# Patient Record
Sex: MALE | URBAN - NONMETROPOLITAN AREA
[De-identification: names, ages, dates, MRNs, and addresses within clinical notes are randomized per-mention and may not be internally consistent; named-entity substitution may affect disease eponyms.]

---

## 2023-12-27 ENCOUNTER — NURSE ONLY (OUTPATIENT)
Dept: LAB | Age: 78
End: 2023-12-27

## 2024-10-17 RX ORDER — TAMSULOSIN HYDROCHLORIDE 0.4 MG/1
0.4 CAPSULE ORAL DAILY
Status: ON HOLD | COMMUNITY
End: 2024-10-25 | Stop reason: HOSPADM

## 2024-10-17 RX ORDER — ASPIRIN 81 MG/1
1 TABLET, CHEWABLE ORAL
COMMUNITY

## 2024-10-17 RX ORDER — GLIMEPIRIDE 2 MG/1
2 TABLET ORAL 2 TIMES DAILY
COMMUNITY

## 2024-10-17 RX ORDER — POTASSIUM CHLORIDE 750 MG/1
20 CAPSULE, EXTENDED RELEASE ORAL DAILY
COMMUNITY

## 2024-10-17 RX ORDER — ATORVASTATIN CALCIUM 40 MG/1
1 TABLET, FILM COATED ORAL
COMMUNITY

## 2024-10-17 RX ORDER — FUROSEMIDE 40 MG/1
1 TABLET ORAL
COMMUNITY

## 2024-10-17 RX ORDER — IBUPROFEN 600 MG/1
600 TABLET, FILM COATED ORAL EVERY 8 HOURS PRN
COMMUNITY

## 2024-10-17 RX ORDER — LOSARTAN POTASSIUM 100 MG/1
1 TABLET ORAL
COMMUNITY

## 2024-10-17 RX ORDER — ATENOLOL 25 MG/1
1 TABLET ORAL
COMMUNITY

## 2024-10-17 RX ORDER — CITALOPRAM HYDROBROMIDE 20 MG/1
20 TABLET ORAL DAILY
COMMUNITY

## 2024-10-17 RX ORDER — ASPIRIN 81 MG/1
81 TABLET ORAL DAILY
COMMUNITY
End: 2024-10-17

## 2024-10-17 NOTE — PROGRESS NOTES
NPO after midnight  Bring insurance info and drivers license  Wear comfortable clean clothing  Do not bring jewelry  Shower night before and morning of surgery with a liquid antibacterial soap  Bring list of medications with dosage and how often taken  Follow all instructions given by your physician   needed at discharge  You must have a responsible adult with you day of surgery and for 24 hours after surgery  Call [facility] [phone] for any questionsSurgical Site Infections”      How can we work together to prevent Surgical Site Infections?   We would like to thank you for choosing [facility] for your Surgical Care.  Below you will find helpful information on how we can work together to prevent Surgical Site Infections.    What is a Surgical Site Infection (SSI)?  A surgical site infection is an infection that occurs after surgery in the part of the body where the surgery took place. Most patients who have surgery do not develop an infection. However, infections develop in about 1 to 3 out of every 100 patients who have surgery.  Some of the common symptoms of a surgical site infection are:  Redness and pain around the area where you had surgery  Drainage of cloudy fluid from your surgical wound  Fever    Can SSIs be treated?  Yes. Most surgical site infections can be treated with antibiotics. The antibiotic given to you depends on the bacteria (germs) causing the infection. Sometimes patients with SSIs also need another surgery to treat the infection.    What are some of the things that hospitals are doing to prevent SSIs?  To prevent SSIs, doctors, nurses, and other healthcare providers:  May remove some of your hair immediately before your surgery using electric clippers if the hair is in the same area where the procedure will occur. They should not shave you with a razor.  Give you antibiotics before your surgery starts. In most cases, you should get antibiotics within 60 minutes before the  wound before you leave the hospital.  Always clean your hands before and after caring for your wound.  Before you go home, make sure you know who to contact if you have questions or problems after you get home.  If you have any symptoms of an infection, such as redness and pain at the surgery site, drainage, or fever, call your doctor immediately.    If you have additional questions, please ask your doctor or nurse.

## 2024-10-17 NOTE — PROGRESS NOTES
Message left with Radha at Dr Escalante' office - looking for H/ P, EKG, Labs, CXR,bowel prep info sheet.

## 2024-10-17 NOTE — PROGRESS NOTES
Radha returned call - stated she talked with pt and he has not done any pre op work. Stated he would get it done tomorrow 10/18/24 at [facility]

## 2024-10-17 NOTE — PROGRESS NOTES
Colleen Garcia from DR Garcia's office called and stated that DR Garcia wants no bowel prep for this surgery

## 2024-10-17 NOTE — PROGRESS NOTES
NEGRO  spoke with pt - unable to talk  now - at the Dr office - will call us back when he is finished at the office

## 2024-10-17 NOTE — PROGRESS NOTES
Message left with Radha at Dr Garcia's  office for need of cardiac clearance for surgery and to inform pt when to stop ASA and Eliquis for surgery .

## 2024-10-24 ENCOUNTER — HOSPITAL ENCOUNTER (OUTPATIENT)
Age: 79
Discharge: HOME | End: 2024-10-25
Attending: UROLOGY | Admitting: UROLOGY
Payer: MEDICARE

## 2024-10-24 ENCOUNTER — ANESTHESIA (OUTPATIENT)
Dept: OPERATING ROOM | Age: 79
End: 2024-10-24
Payer: MEDICARE

## 2024-10-24 ENCOUNTER — ANESTHESIA EVENT (OUTPATIENT)
Dept: OPERATING ROOM | Age: 79
End: 2024-10-24
Payer: MEDICARE

## 2024-10-24 DIAGNOSIS — C61 MALIGNANT NEOPLASM OF PROSTATE (HCC): Primary | ICD-10-CM

## 2024-10-24 LAB
GLUCOSE BLD STRIP.AUTO-MCNC: 165 MG/DL (ref 70–108)
GLUCOSE BLD STRIP.AUTO-MCNC: 183 MG/DL (ref 70–108)

## 2024-10-24 PROCEDURE — 6370000000 HC RX 637 (ALT 250 FOR IP): Performed by: UROLOGY

## 2024-10-24 PROCEDURE — 3600000019 HC SURGERY ROBOT ADDTL 15MIN: Performed by: UROLOGY

## 2024-10-24 PROCEDURE — 3700000000 HC ANESTHESIA ATTENDED CARE: Performed by: UROLOGY

## 2024-10-24 PROCEDURE — C1889 IMPLANT/INSERT DEVICE, NOC: HCPCS | Performed by: UROLOGY

## 2024-10-24 PROCEDURE — 2500000003 HC RX 250 WO HCPCS: Performed by: NURSE ANESTHETIST, CERTIFIED REGISTERED

## 2024-10-24 PROCEDURE — 88309 TISSUE EXAM BY PATHOLOGIST: CPT

## 2024-10-24 PROCEDURE — 2720000010 HC SURG SUPPLY STERILE: Performed by: UROLOGY

## 2024-10-24 PROCEDURE — 2580000003 HC RX 258: Performed by: UROLOGY

## 2024-10-24 PROCEDURE — S2900 ROBOTIC SURGICAL SYSTEM: HCPCS | Performed by: UROLOGY

## 2024-10-24 PROCEDURE — 6360000002 HC RX W HCPCS: Performed by: UROLOGY

## 2024-10-24 PROCEDURE — 88305 TISSUE EXAM BY PATHOLOGIST: CPT

## 2024-10-24 PROCEDURE — 7100000001 HC PACU RECOVERY - ADDTL 15 MIN: Performed by: UROLOGY

## 2024-10-24 PROCEDURE — 3600000009 HC SURGERY ROBOT BASE: Performed by: UROLOGY

## 2024-10-24 PROCEDURE — 3700000001 HC ADD 15 MINUTES (ANESTHESIA): Performed by: UROLOGY

## 2024-10-24 PROCEDURE — 2500000003 HC RX 250 WO HCPCS: Performed by: UROLOGY

## 2024-10-24 PROCEDURE — 82948 REAGENT STRIP/BLOOD GLUCOSE: CPT

## 2024-10-24 PROCEDURE — 7100000000 HC PACU RECOVERY - FIRST 15 MIN: Performed by: UROLOGY

## 2024-10-24 PROCEDURE — 2709999900 HC NON-CHARGEABLE SUPPLY: Performed by: UROLOGY

## 2024-10-24 PROCEDURE — 6360000002 HC RX W HCPCS: Performed by: NURSE ANESTHETIST, CERTIFIED REGISTERED

## 2024-10-24 DEVICE — ALLOGRAFT HUM TISS 6X3 CM CRYOPRESERVED AMNIOX CLARIX CRD 1K: Type: IMPLANTABLE DEVICE | Site: PROSTATE | Status: FUNCTIONAL

## 2024-10-24 DEVICE — HEMOLOK L 6 CLIPS/CART
Type: IMPLANTABLE DEVICE | Site: PROSTATE | Status: FUNCTIONAL
Brand: WECK

## 2024-10-24 RX ORDER — MORPHINE SULFATE 2 MG/ML
2 INJECTION, SOLUTION INTRAMUSCULAR; INTRAVENOUS
Status: DISCONTINUED | OUTPATIENT
Start: 2024-10-24 | End: 2024-10-25 | Stop reason: HOSPADM

## 2024-10-24 RX ORDER — PHENAZOPYRIDINE HYDROCHLORIDE 100 MG/1
100 TABLET, FILM COATED ORAL EVERY 6 HOURS PRN
Status: DISCONTINUED | OUTPATIENT
Start: 2024-10-24 | End: 2024-10-25 | Stop reason: HOSPADM

## 2024-10-24 RX ORDER — SODIUM CHLORIDE 0.9 % (FLUSH) 0.9 %
5-40 SYRINGE (ML) INJECTION EVERY 12 HOURS SCHEDULED
Status: DISCONTINUED | OUTPATIENT
Start: 2024-10-24 | End: 2024-10-25 | Stop reason: HOSPADM

## 2024-10-24 RX ORDER — ACETAMINOPHEN 325 MG/1
650 TABLET ORAL EVERY 6 HOURS
Status: DISCONTINUED | OUTPATIENT
Start: 2024-10-24 | End: 2024-10-25 | Stop reason: HOSPADM

## 2024-10-24 RX ORDER — POTASSIUM CHLORIDE 750 MG/1
20 TABLET, EXTENDED RELEASE ORAL DAILY
Status: DISCONTINUED | OUTPATIENT
Start: 2024-10-24 | End: 2024-10-25 | Stop reason: HOSPADM

## 2024-10-24 RX ORDER — OXYCODONE HYDROCHLORIDE 5 MG/1
10 TABLET ORAL EVERY 4 HOURS PRN
Status: DISCONTINUED | OUTPATIENT
Start: 2024-10-24 | End: 2024-10-25 | Stop reason: HOSPADM

## 2024-10-24 RX ORDER — MORPHINE SULFATE 4 MG/ML
4 INJECTION, SOLUTION INTRAMUSCULAR; INTRAVENOUS
Status: DISCONTINUED | OUTPATIENT
Start: 2024-10-24 | End: 2024-10-25 | Stop reason: HOSPADM

## 2024-10-24 RX ORDER — MULTIVITAMIN WITH IRON
1 TABLET ORAL DAILY
Status: DISCONTINUED | OUTPATIENT
Start: 2024-10-24 | End: 2024-10-25 | Stop reason: HOSPADM

## 2024-10-24 RX ORDER — CITALOPRAM HYDROBROMIDE 20 MG/1
20 TABLET ORAL DAILY
Status: DISCONTINUED | OUTPATIENT
Start: 2024-10-24 | End: 2024-10-25 | Stop reason: HOSPADM

## 2024-10-24 RX ORDER — SODIUM CHLORIDE, SODIUM LACTATE, POTASSIUM CHLORIDE, CALCIUM CHLORIDE 600; 310; 30; 20 MG/100ML; MG/100ML; MG/100ML; MG/100ML
INJECTION, SOLUTION INTRAVENOUS CONTINUOUS
Status: DISCONTINUED | OUTPATIENT
Start: 2024-10-24 | End: 2024-10-24 | Stop reason: HOSPADM

## 2024-10-24 RX ORDER — SODIUM CHLORIDE 0.9 % (FLUSH) 0.9 %
5-40 SYRINGE (ML) INJECTION EVERY 12 HOURS SCHEDULED
Status: DISCONTINUED | OUTPATIENT
Start: 2024-10-24 | End: 2024-10-24 | Stop reason: HOSPADM

## 2024-10-24 RX ORDER — SODIUM CHLORIDE 0.9 % (FLUSH) 0.9 %
5-40 SYRINGE (ML) INJECTION PRN
Status: DISCONTINUED | OUTPATIENT
Start: 2024-10-24 | End: 2024-10-24 | Stop reason: HOSPADM

## 2024-10-24 RX ORDER — GLYCOPYRROLATE 1 MG/5 ML
SYRINGE (ML) INTRAVENOUS
Status: DISCONTINUED | OUTPATIENT
Start: 2024-10-24 | End: 2024-10-24 | Stop reason: SDUPTHER

## 2024-10-24 RX ORDER — FUROSEMIDE 40 MG/1
40 TABLET ORAL DAILY
Status: DISCONTINUED | OUTPATIENT
Start: 2024-10-24 | End: 2024-10-25 | Stop reason: HOSPADM

## 2024-10-24 RX ORDER — BUPIVACAINE HYDROCHLORIDE 5 MG/ML
INJECTION, SOLUTION PERINEURAL PRN
Status: DISCONTINUED | OUTPATIENT
Start: 2024-10-24 | End: 2024-10-24 | Stop reason: ALTCHOICE

## 2024-10-24 RX ORDER — LIDOCAINE HYDROCHLORIDE 20 MG/ML
INJECTION, SOLUTION INTRAVENOUS
Status: DISCONTINUED | OUTPATIENT
Start: 2024-10-24 | End: 2024-10-24 | Stop reason: SDUPTHER

## 2024-10-24 RX ORDER — ROCURONIUM BROMIDE 10 MG/ML
INJECTION, SOLUTION INTRAVENOUS
Status: DISCONTINUED | OUTPATIENT
Start: 2024-10-24 | End: 2024-10-24 | Stop reason: SDUPTHER

## 2024-10-24 RX ORDER — NEOMYCIN/BACITRACIN/POLYMYXINB 3.5-400-5K
OINTMENT (GRAM) TOPICAL 2 TIMES DAILY
Status: DISCONTINUED | OUTPATIENT
Start: 2024-10-24 | End: 2024-10-25 | Stop reason: HOSPADM

## 2024-10-24 RX ORDER — TROSPIUM CHLORIDE 20 MG/1
20 TABLET, FILM COATED ORAL
Status: DISCONTINUED | OUTPATIENT
Start: 2024-10-24 | End: 2024-10-25 | Stop reason: HOSPADM

## 2024-10-24 RX ORDER — ONDANSETRON 2 MG/ML
INJECTION INTRAMUSCULAR; INTRAVENOUS
Status: DISCONTINUED | OUTPATIENT
Start: 2024-10-24 | End: 2024-10-24 | Stop reason: SDUPTHER

## 2024-10-24 RX ORDER — ATENOLOL 25 MG/1
25 TABLET ORAL DAILY
Status: DISCONTINUED | OUTPATIENT
Start: 2024-10-25 | End: 2024-10-25 | Stop reason: HOSPADM

## 2024-10-24 RX ORDER — OXYCODONE HYDROCHLORIDE 5 MG/1
5 TABLET ORAL EVERY 4 HOURS PRN
Status: DISCONTINUED | OUTPATIENT
Start: 2024-10-24 | End: 2024-10-25 | Stop reason: HOSPADM

## 2024-10-24 RX ORDER — LOSARTAN POTASSIUM 100 MG/1
100 TABLET ORAL DAILY
Status: DISCONTINUED | OUTPATIENT
Start: 2024-10-24 | End: 2024-10-25 | Stop reason: HOSPADM

## 2024-10-24 RX ORDER — PROPOFOL 10 MG/ML
INJECTION, EMULSION INTRAVENOUS
Status: DISCONTINUED | OUTPATIENT
Start: 2024-10-24 | End: 2024-10-24 | Stop reason: SDUPTHER

## 2024-10-24 RX ORDER — ATORVASTATIN CALCIUM 40 MG/1
40 TABLET, FILM COATED ORAL NIGHTLY
Status: DISCONTINUED | OUTPATIENT
Start: 2024-10-24 | End: 2024-10-25 | Stop reason: HOSPADM

## 2024-10-24 RX ORDER — FENTANYL CITRATE 50 UG/ML
INJECTION, SOLUTION INTRAMUSCULAR; INTRAVENOUS
Status: DISCONTINUED | OUTPATIENT
Start: 2024-10-24 | End: 2024-10-24 | Stop reason: SDUPTHER

## 2024-10-24 RX ORDER — SODIUM CHLORIDE 9 MG/ML
INJECTION, SOLUTION INTRAVENOUS PRN
Status: DISCONTINUED | OUTPATIENT
Start: 2024-10-24 | End: 2024-10-25 | Stop reason: HOSPADM

## 2024-10-24 RX ORDER — SODIUM CHLORIDE, SODIUM LACTATE, POTASSIUM CHLORIDE, CALCIUM CHLORIDE 600; 310; 30; 20 MG/100ML; MG/100ML; MG/100ML; MG/100ML
INJECTION, SOLUTION INTRAVENOUS CONTINUOUS
Status: DISCONTINUED | OUTPATIENT
Start: 2024-10-24 | End: 2024-10-24

## 2024-10-24 RX ORDER — SODIUM CHLORIDE 9 MG/ML
INJECTION, SOLUTION INTRAVENOUS PRN
Status: DISCONTINUED | OUTPATIENT
Start: 2024-10-24 | End: 2024-10-24 | Stop reason: HOSPADM

## 2024-10-24 RX ORDER — DEXAMETHASONE SODIUM PHOSPHATE 4 MG/ML
INJECTION, SOLUTION INTRA-ARTICULAR; INTRALESIONAL; INTRAMUSCULAR; INTRAVENOUS; SOFT TISSUE
Status: DISCONTINUED | OUTPATIENT
Start: 2024-10-24 | End: 2024-10-24 | Stop reason: SDUPTHER

## 2024-10-24 RX ORDER — SODIUM CHLORIDE 0.9 % (FLUSH) 0.9 %
5-40 SYRINGE (ML) INJECTION PRN
Status: DISCONTINUED | OUTPATIENT
Start: 2024-10-24 | End: 2024-10-25 | Stop reason: HOSPADM

## 2024-10-24 RX ADMIN — ROCURONIUM BROMIDE 50 MG: 10 INJECTION INTRAVENOUS at 10:50

## 2024-10-24 RX ADMIN — DEXAMETHASONE SODIUM PHOSPHATE 8 MG: 4 INJECTION, SOLUTION INTRAMUSCULAR; INTRAVENOUS at 11:45

## 2024-10-24 RX ADMIN — ROCURONIUM BROMIDE 50 MG: 10 INJECTION INTRAVENOUS at 11:22

## 2024-10-24 RX ADMIN — CITALOPRAM 20 MG: 20 TABLET, FILM COATED ORAL at 17:11

## 2024-10-24 RX ADMIN — LIDOCAINE HYDROCHLORIDE 50 MG: 20 INJECTION, SOLUTION INTRAVENOUS at 10:50

## 2024-10-24 RX ADMIN — BACITRACIN ZINC NEOMYCIN SULFATE POLYMYXIN B SULFATE: 400; 3.5; 5 OINTMENT TOPICAL at 20:11

## 2024-10-24 RX ADMIN — PROPOFOL 200 MG: 10 INJECTION, EMULSION INTRAVENOUS at 10:50

## 2024-10-24 RX ADMIN — METHYLENE BLUE 5 MG: 5 INJECTION INTRAVENOUS at 11:53

## 2024-10-24 RX ADMIN — SODIUM CHLORIDE: 9 INJECTION, SOLUTION INTRAVENOUS at 10:31

## 2024-10-24 RX ADMIN — BACITRACIN ZINC NEOMYCIN SULFATE POLYMYXIN B SULFATE: 400; 3.5; 5 OINTMENT TOPICAL at 17:15

## 2024-10-24 RX ADMIN — LOSARTAN POTASSIUM 100 MG: 100 TABLET, FILM COATED ORAL at 17:13

## 2024-10-24 RX ADMIN — SODIUM CHLORIDE, PRESERVATIVE FREE 10 ML: 5 INJECTION INTRAVENOUS at 20:10

## 2024-10-24 RX ADMIN — ONDANSETRON 4 MG: 2 INJECTION INTRAMUSCULAR; INTRAVENOUS at 11:45

## 2024-10-24 RX ADMIN — Medication 0.4 MG: at 11:26

## 2024-10-24 RX ADMIN — LIDOCAINE HYDROCHLORIDE 50 MG: 20 INJECTION, SOLUTION INTRAVENOUS at 10:45

## 2024-10-24 RX ADMIN — WATER 2000 MG: 1 INJECTION INTRAMUSCULAR; INTRAVENOUS; SUBCUTANEOUS at 10:56

## 2024-10-24 RX ADMIN — SUGAMMADEX 200 MG: 100 INJECTION, SOLUTION INTRAVENOUS at 13:01

## 2024-10-24 RX ADMIN — ATORVASTATIN CALCIUM 40 MG: 40 TABLET, FILM COATED ORAL at 20:10

## 2024-10-24 RX ADMIN — METFORMIN HYDROCHLORIDE 1000 MG: 500 TABLET ORAL at 17:15

## 2024-10-24 RX ADMIN — FUROSEMIDE 40 MG: 40 TABLET ORAL at 17:12

## 2024-10-24 RX ADMIN — OXYCODONE HYDROCHLORIDE 10 MG: 5 TABLET ORAL at 15:00

## 2024-10-24 RX ADMIN — POTASSIUM CHLORIDE 20 MEQ: 750 TABLET, EXTENDED RELEASE ORAL at 17:13

## 2024-10-24 RX ADMIN — OXYCODONE 5 MG: 5 TABLET ORAL at 20:10

## 2024-10-24 RX ADMIN — FENTANYL CITRATE 100 MCG: 50 INJECTION, SOLUTION INTRAMUSCULAR; INTRAVENOUS at 10:45

## 2024-10-24 ASSESSMENT — PAIN - FUNCTIONAL ASSESSMENT
PAIN_FUNCTIONAL_ASSESSMENT: 0-10
PAIN_FUNCTIONAL_ASSESSMENT: NONE - DENIES PAIN

## 2024-10-24 ASSESSMENT — PAIN SCALES - GENERAL
PAINLEVEL_OUTOF10: 7
PAINLEVEL_OUTOF10: 7
PAINLEVEL_OUTOF10: 3
PAINLEVEL_OUTOF10: 6
PAINLEVEL_OUTOF10: 7

## 2024-10-24 NOTE — PROGRESS NOTES
Pt admitted to [facility] room 20 and oriented to unit. SCD sleeves applied. Nares swabbed. Pt verbalized permission for first name, last initial and physicians name on white board. SDS board and discharge criteria explained, pt and family verbalized understanding. Pt denies thoughts of harming self or others. Call light in reach. Family at the bedside.

## 2024-10-24 NOTE — ANESTHESIA POSTPROCEDURE EVALUATION
Department of Anesthesiology  Postprocedure Note    Patient: Gamaliel Segovia  MRN: [id number]  YOB: 1945  Date of evaluation: 10/24/2024    Procedure Summary       Date: 10/24/24 Room / Location: [facility] OR  / [facility] OR    Anesthesia Start: 1045 Anesthesia Stop: 1332    Procedure: Robotic Radical Prostatectomy with Pelvic Lymph Node Dissection and Nerve Wrap Graft (Abdomen) Diagnosis:       Malignant neoplasm of prostate (HCC)      (Malignant neoplasm of prostate (HCC) [C61])    Surgeons: Mekhi Garcia MD Responsible Provider: Lee Dominguez DO    Anesthesia Type: general ASA Status: 3            Anesthesia Type: No value filed.    Sonya Phase I: Sonya Score: 10    Sonya Phase II:      Anesthesia Post Evaluation    Patient location during evaluation: PACU  Patient participation: complete - patient participated  Level of consciousness: awake  Airway patency: patent  Nausea & Vomiting: no nausea  Cardiovascular status: hemodynamically stable  Respiratory status: acceptable  Hydration status: stable  Pain management: adequate    No notable events documented.

## 2024-10-24 NOTE — ANESTHESIA PRE PROCEDURE
BP Readings from Last 3 Encounters:   10/24/24 (!) 149/82       NPO Status: Time of last liquid consumption: 0700 (sip of water with medications)                        Time of last solid consumption: 1900                        Date of last liquid consumption: 10/24/24                        Date of last solid food consumption: 10/23/24    BMI:   Wt Readings from Last 3 Encounters:   10/24/24 94.8 kg (209 lb)     Body mass index is 29.15 kg/m².    CBC: No results found for: \"WBC\", \"RBC\", \"HGB\", \"HCT\", \"MCV\", \"RDW\", \"PLT\"    CMP: No results found for: \"NA\", \"K\", \"CL\", \"CO2\", \"BUN\", \"CREATININE\", \"GFRAA\", \"AGRATIO\", \"LABGLOM\", \"GLUCOSE\", \"GLU\", \"CALCIUM\", \"BILITOT\", \"ALKPHOS\", \"AST\", \"ALT\"    POC Tests: No results for input(s): \"POCGLU\", \"POCNA\", \"POCK\", \"POCCL\", \"POCBUN\", \"POCHEMO\", \"POCHCT\" in the last 72 hours.    Coags: No results found for: \"PROTIME\", \"INR\", \"APTT\"    HCG (If Applicable): No results found for: \"PREGTESTUR\", \"PREGSERUM\", \"HCG\", \"HCGQUANT\"     ABGs: No results found for: \"PHART\", \"PO2ART\", \"JQR8FBG\", \"WSK8RMX\", \"BEART\", \"W5NMHPKZ\"     Type & Screen (If Applicable):  No results found for: \"ABORH\", \"LABANTI\"    Drug/Infectious Status (If Applicable):  No results found for: \"HIV\", \"HEPCAB\"    COVID-19 Screening (If Applicable): No results found for: \"COVID19\"        Anesthesia Evaluation  Patient summary reviewed and Nursing notes reviewed   no history of anesthetic complications:   Airway: Mallampati: II          Dental:          Pulmonary: breath sounds clear to auscultation                             Cardiovascular:  Exercise tolerance: no interval change  (+) hypertension:, CAD:, CABG/stent:      ECG reviewed  Rhythm: regular  Rate: normal  Echocardiogram reviewed                  Neuro/Psych:               GI/Hepatic/Renal:             Endo/Other:    (+) Diabeteswell controlled.                 Abdominal:             Vascular:          Other Findings:       Anesthesia Plan      general

## 2024-10-24 NOTE — PROGRESS NOTES
Pt from recovery to 6A rm 16 alicea to straight drain with red urine, oriented to rm, call light in reach, bed alarm on, spouse at the bedside

## 2024-10-24 NOTE — INTERVAL H&P NOTE
Update History & Physical    The patient's History and Physical of October 23, 2024 was reviewed with the patient and I examined the patient. There was no change. The surgical site was confirmed by the patient and me.     Plan: The risks, benefits, expected outcome, and alternative to the recommended procedure have been discussed with the patient. Patient understands and wants to proceed with the procedure.     Electronically signed by Mekhi Garcia MD on 10/24/2024 at 1:31 PM

## 2024-10-24 NOTE — PROGRESS NOTES
1324 - Pt arrives to pacu at this time. Pt is alert, Denies pain. Respirs easy and unlabored on room air. Incisiions x6 on abd CDI. Warming device applied. VSS.     1338 - bs 183    1345 - Ice chips given. Pt denies needs.     1354 - Pt meets criteria to discharge from pacu at this time. Pain is tolerable per pt. Respirs unlabored on room air. VSS. Awaiting bed placement.     1400 Report called to Elizabeth on 6A. Awating transport.     1415 - Pt transported to 6A-16 via transport in stable condition. Notified SDS to update familt.

## 2024-10-24 NOTE — H&P
Gamaliel Segovia  : 1945  ID: [id number], MRN: [id number]  [facility] [address] [place], [place], [id number]      [facility]  Urology    [address]  [place] [address]  [phone] Fax: [phone]  History and Physical  CC  This is a 79 year old Male with \"TELEHEALTH TEXT LINK -[phone] 2-3 weeks 30 min can be telehealth to discuss poss RALRP\" [Source: Patient]  HPI  This telehealth visit was conducted today using audio and video communication technology. The patient was located at home, and I was at my apartment in [place], [place], adhering to the established standards for telehealth services under Medicare and other insurance guidelines. Verbal consent for telehealth was obtained and documented.    The patient reports having a scheduled surgery in the upcoming weeks. They express concerns about the duration of the surgery and the postoperative experience, specifically the use of a catheter for one week. The patient inquires about the level of pain and discomfort associated with the procedure, comparing it to past experiences with open incision surgeries. They also ask about preoperative instructions and any necessary medication adjustments. The patient seeks reassurance and clarification about the surgical process and emphasizes the importance of feeling comfortable and well-informed throughout their treatment.    PSA History:    23: 8.20    Medical History   Elevated PSA (Active)   A-fib (Resolved) - Ablation about 1 year ago   Verdigo (Active)   Malignant Neoplasm Of Prostate [C61] (Active)   Diabetes 2 (Active)    Surgical History   Cataract Surgery ()   Knee Surgery ()   Heart Stents 2 Times ()   Gall Bladder Removed ()   Tonsillectomy ()   Heart Ablation ()   Hernia Surgery ()   Prostate Biopsy ()   Staples In Head ()      Reconciled by: Ms. Oneyda Nguyễn  Social History   Marital Status:    Consumption: Tobacco (Former Smoker - 3)   Consumption: Recreational Drugs

## 2024-10-24 NOTE — OP NOTE
Operative Note      Patient: Gamaliel Segovia  YOB: 1945  MRN: [id number]    Date of Procedure: 10/24/2024    Pre-Op Diagnosis Codes:      * Malignant neoplasm of prostate (HCC) [C61]    Post-Op Diagnosis: Same       Procedure(s):  Robotic Radical Prostatectomy with Pelvic Lymph Node Dissection and Nerve Wrap Graft, bladder neck reconstruction -- ADDED PROCEDURAL SERVICES -- 22    Surgeon(s):  Mekhi Garcia MD    Assistant:   First Assistant: Tess Luevano, RN    Anesthesia: General    Estimated Blood Loss (mL): less than 50     Complications: None    Specimens:   ID Type Source Tests Collected by Time Destination   A : Prostate Tissue Prostate SURGICAL PATHOLOGY Mekhi Garcia MD 10/24/2024 1214    B : Right Pelvic Lymph Node Tissue Lymph Node SURGICAL PATHOLOGY Mekhi Garcia MD 10/24/2024 1214    C : Left Pelvic Lymph Node Tissue Lymph Node SURGICAL PATHOLOGY Mekhi Garcia MD 10/24/2024 1215        Implants:  Implant Name Type Inv. Item Serial No.  Lot No. LRB No. Used Action   CLIP INT L POLYMER ELLEN LIG HEM O ELLEN (6EA/PK) - [id number]  CLIP INT L POLYMER ELLEN LIG HEM O ELLEN (6EA/PK)  HLR Properties- [id number] N/A 2 Implanted   ALLOGRAFT HUM TISS 6X3 CM CRYOPRESERVED AMNIOX CLARIX CRD 1K - [id number]  ALLOGRAFT HUM TISS 6X3 CM CRYOPRESERVED AMNIOX CLARIX CRD 1K [id number] TISSUE Finicity  N/A 1 Implanted         Drains:   Urinary Catheter 10/24/24 Rasmussen (Active)       [REMOVED] Urinary Catheter 10/24/24 Rasmussen (Removed)       Findings:  Infection Present At Time Of Surgery (PATOS) (choose all levels that have infection present):  No infection present  Other Findings: very large prostate    Detailed Description of Procedure:      DISCUSSION:  Gamaliel is a 79 y.o.-year-old male who has a diagnosis of Prostate cancer. After a history and physical examination was performed, potential diagnostic and therapeutic modalities were discussed with the patient.  RALRP  we were erring toward the bladder side. This was taken all the way down underneath the prostate to the Denonvilliers fascia layers. These were opened and the vas deferens found on both sides.     The posterior dissection was performed freeing up the vas on both sides approximately 3-4 cm and then cutting these. These were elevated up exposing the underlying seminal vesicles which were also dissected free and elevated.  With the prostate elevated up, a good space was made between the prostate and the rectum going between the 2 layers of Denonvilliers fascia. This was taken up as high as possible toward the apex of the prostate. At this point, all that remained were the lateral pedicles and the neurovascular bundles and then, the distal attachments.      The pedicles were secured by making pillars and placing clips on the pedicles. They were then cut coldly and any small blood vessels were carefully cauterized using bipolar cautery. The neurovascular bundle first on the left and on the right was carefully dissected off of the prostate making sure that we were outside of the prostatic fascia but leaving an adequate neurovascular bundle in order to help with potency and continence.  After neurovascular bundles were freed all the way to the apex of the prostate all that remained was the dorsal vein, the urethra and the rectourethralis muscle.     The dorsal vein was cut with cautery current freeing up the underlying urethra and allowing the prostate to fall even further from the distal pelvis. This provided some length to the urethra and the urethra was carefully cold cut making sure that we were a few millimeters from the prostate. The underlying rectourethralis muscle was then cut and the prostate completely freed. The prostate was placed in EndoCatch bag along with the fat that had been dissected out at the beginning of the procedure.  At this point, the pelvis was washed and dried and then the anastomosis begun.

## 2024-10-25 VITALS
BODY MASS INDEX: 29.26 KG/M2 | OXYGEN SATURATION: 98 % | HEIGHT: 71 IN | SYSTOLIC BLOOD PRESSURE: 125 MMHG | RESPIRATION RATE: 16 BRPM | DIASTOLIC BLOOD PRESSURE: 61 MMHG | WEIGHT: 209 LBS | HEART RATE: 84 BPM | TEMPERATURE: 98.1 F

## 2024-10-25 PROBLEM — C61 MALIGNANT NEOPLASM OF PROSTATE (HCC): Status: ACTIVE | Noted: 2024-10-25

## 2024-10-25 LAB
ANION GAP SERPL CALC-SCNC: 16 MEQ/L (ref 8–16)
BASOPHILS ABSOLUTE: 0 THOU/MM3 (ref 0–0.1)
BASOPHILS NFR BLD AUTO: 0.1 %
BUN SERPL-MCNC: 10 MG/DL (ref 7–22)
CALCIUM SERPL-MCNC: 8.1 MG/DL (ref 8.5–10.5)
CHLORIDE SERPL-SCNC: 95 MEQ/L (ref 98–111)
CO2 SERPL-SCNC: 19 MEQ/L (ref 23–33)
CREAT SERPL-MCNC: 0.6 MG/DL (ref 0.4–1.2)
DEPRECATED RDW RBC AUTO: 44.3 FL (ref 35–45)
EOSINOPHIL NFR BLD AUTO: 0.1 %
EOSINOPHILS ABSOLUTE: 0 THOU/MM3 (ref 0–0.4)
ERYTHROCYTE [DISTWIDTH] IN BLOOD BY AUTOMATED COUNT: 12.8 % (ref 11.5–14.5)
GFR SERPL CREATININE-BSD FRML MDRD: > 90 ML/MIN/1.73M2
GLUCOSE SERPL-MCNC: 164 MG/DL (ref 70–108)
HCT VFR BLD AUTO: 35.1 % (ref 42–52)
HGB BLD-MCNC: 12 GM/DL (ref 14–18)
IMM GRANULOCYTES # BLD AUTO: 0.12 THOU/MM3 (ref 0–0.07)
IMM GRANULOCYTES NFR BLD AUTO: 0.7 %
LYMPHOCYTES ABSOLUTE: 1.6 THOU/MM3 (ref 1–4.8)
LYMPHOCYTES NFR BLD AUTO: 9.4 %
MCH RBC QN AUTO: 32.3 PG (ref 26–33)
MCHC RBC AUTO-ENTMCNC: 34.2 GM/DL (ref 32.2–35.5)
MCV RBC AUTO: 94.6 FL (ref 80–94)
MONOCYTES ABSOLUTE: 1.4 THOU/MM3 (ref 0.4–1.3)
MONOCYTES NFR BLD AUTO: 8.1 %
NEUTROPHILS ABSOLUTE: 13.6 THOU/MM3 (ref 1.8–7.7)
NEUTROPHILS NFR BLD AUTO: 81.6 %
NRBC BLD AUTO-RTO: 0 /100 WBC
PLATELET # BLD AUTO: 287 THOU/MM3 (ref 130–400)
PMV BLD AUTO: 9.1 FL (ref 9.4–12.4)
POTASSIUM SERPL-SCNC: 4.4 MEQ/L (ref 3.5–5.2)
RBC # BLD AUTO: 3.71 MILL/MM3 (ref 4.7–6.1)
SODIUM SERPL-SCNC: 130 MEQ/L (ref 135–145)
WBC # BLD AUTO: 16.7 THOU/MM3 (ref 4.8–10.8)

## 2024-10-25 PROCEDURE — 80048 BASIC METABOLIC PNL TOTAL CA: CPT

## 2024-10-25 PROCEDURE — 99222 1ST HOSP IP/OBS MODERATE 55: CPT | Performed by: STUDENT IN AN ORGANIZED HEALTH CARE EDUCATION/TRAINING PROGRAM

## 2024-10-25 PROCEDURE — 36415 COLL VENOUS BLD VENIPUNCTURE: CPT

## 2024-10-25 PROCEDURE — 85025 COMPLETE CBC W/AUTO DIFF WBC: CPT

## 2024-10-25 PROCEDURE — 6370000000 HC RX 637 (ALT 250 FOR IP): Performed by: UROLOGY

## 2024-10-25 RX ORDER — DOCUSATE SODIUM 100 MG/1
100 CAPSULE, LIQUID FILLED ORAL 2 TIMES DAILY
Qty: 60 CAPSULE | Refills: 0 | Status: SHIPPED | OUTPATIENT
Start: 2024-10-25 | End: 2024-11-24

## 2024-10-25 RX ORDER — TRAMADOL HYDROCHLORIDE 50 MG/1
50 TABLET ORAL EVERY 6 HOURS PRN
Qty: 12 TABLET | Refills: 0 | Status: SHIPPED | OUTPATIENT
Start: 2024-10-25 | End: 2024-10-28

## 2024-10-25 RX ADMIN — ACETAMINOPHEN 650 MG: 325 TABLET ORAL at 00:18

## 2024-10-25 RX ADMIN — ATENOLOL 25 MG: 25 TABLET ORAL at 07:33

## 2024-10-25 RX ADMIN — OXYCODONE HYDROCHLORIDE 10 MG: 5 TABLET ORAL at 00:18

## 2024-10-25 RX ADMIN — FUROSEMIDE 40 MG: 40 TABLET ORAL at 07:34

## 2024-10-25 RX ADMIN — ACETAMINOPHEN 650 MG: 325 TABLET ORAL at 06:37

## 2024-10-25 RX ADMIN — LOSARTAN POTASSIUM 100 MG: 100 TABLET, FILM COATED ORAL at 07:34

## 2024-10-25 RX ADMIN — METFORMIN HYDROCHLORIDE 1000 MG: 500 TABLET ORAL at 07:35

## 2024-10-25 RX ADMIN — POTASSIUM CHLORIDE 20 MEQ: 750 TABLET, EXTENDED RELEASE ORAL at 07:35

## 2024-10-25 RX ADMIN — BACITRACIN ZINC NEOMYCIN SULFATE POLYMYXIN B SULFATE: 400; 3.5; 5 OINTMENT TOPICAL at 07:35

## 2024-10-25 RX ADMIN — OXYCODONE HYDROCHLORIDE 10 MG: 5 TABLET ORAL at 10:10

## 2024-10-25 RX ADMIN — OXYCODONE HYDROCHLORIDE 10 MG: 5 TABLET ORAL at 04:22

## 2024-10-25 ASSESSMENT — PAIN SCALES - GENERAL
PAINLEVEL_OUTOF10: 4
PAINLEVEL_OUTOF10: 7
PAINLEVEL_OUTOF10: 7
PAINLEVEL_OUTOF10: 3
PAINLEVEL_OUTOF10: 3
PAINLEVEL_OUTOF10: 4
PAINLEVEL_OUTOF10: 3
PAINLEVEL_OUTOF10: 7

## 2024-10-25 NOTE — DISCHARGE INSTRUCTIONS
CARING FOR YOUR INCISION AT HOME    Wash your hands thoroughly with soap and water before touching your incision.     Take a clean wash cloth with DYNAHEX and water and cleanse the incision. Pat dry with a clean towel.     Steri strips will fall off on their own. Do NOT pick and or pull at them.     Let the incision air dry before applying new dressing if needed.     Do NOT place anything in or on your incision other than a clean dressing.     Do NOT clean your incision with anything other than soap and water unless you were instructed to do so by your physician.     Do NOT touch your incision unless your hands are clean and you are performing incision care. Try to touch it at least as possible.     NO powders, lotions, perfumes, or deodorants near incisions.     If you notice an increase in drainage, only use a clean piece of gauze to cover the incision and notify your physician.         A Rasmussen catheter is a thin, flexible tube that drains urine from your bladder. The catheter connects to a special bag. The bag holds the urine until you are able to empty the bag. You may need to have a catheter for a short time. You may need a catheter after you are sick or have had surgery. Sometimes, a catheter is used for a long time.    What will the results be?   Your urine will drain and you will prevent infection.  What care is needed at home?   Ask your doctor what you need to do when you go home. Make sure you ask questions if you do not understand what the doctor says. This way you will know what you need to do.  Your doctor may order a home health nurse to come to your house to help you learn to care for your catheter.  Prevent infections:  Wash your hands before and after handling your catheter.  If you switch between a leg bag and an overnight drainage bag, be sure you clean the connection between the catheter and the bag before you switch bags.  Ask your doctor what to use to clean the connection.  Rinse

## 2024-10-25 NOTE — PROGRESS NOTES
Chief Complains:    There is no problem list on file for this patient.      Ding great.  Passing flatus and feeling much better.  Eating breakfast.    Vitals:    10/25/24 0330 10/25/24 0422 10/25/24 0452 10/25/24 0729   BP: 123/62   125/61   Pulse: 80   84   Resp: 18 18 18 16   Temp: 98.6 °F (37 °C)   98.1 °F (36.7 °C)   TempSrc: Oral   Oral   SpO2: 98%   98%   Weight:       Height:             Intake/Output Summary (Last 24 hours) at 10/25/2024 0846  Last data filed at 10/25/2024 0638  Gross per 24 hour   Intake 1220 ml   Output 2775 ml   Net -1555 ml       Labs  Recent Labs     10/25/24  0629   WBC 16.7*   HGB 12.0*   HCT 35.1*      *   K 4.4   CL 95*   CO2 19*   BUN 10   CREATININE 0.6   CALCIUM 8.1*        Exam  General: awake, alert and NAD.  Abdomen: soft and non-tender  Ext: calves soft and non-tender  : alicea in place and urine clear    Interim progress notes reviewed      Assessment and Plan  Diagnosis: post RALRP -- ready for discharge    Doing well and ready for discharge.    Has appointment for next week for catheter removal in the office.    Will check labs next Monday as out patient.

## 2024-10-25 NOTE — PROGRESS NOTES
Discharge teaching given, alicea teaching given, pt will be discharged, awaiting for scripts to come up.

## 2024-10-25 NOTE — PLAN OF CARE
Problem: Discharge Planning  Goal: Discharge to home or other facility with appropriate resources  Outcome: Progressing  Flowsheets (Taken 10/24/2024 2007)  Discharge to home or other facility with appropriate resources:   Identify barriers to discharge with patient and caregiver   Arrange for needed discharge resources and transportation as appropriate   Identify discharge learning needs (meds, wound care, etc)     Problem: Chronic Conditions and Co-morbidities  Goal: Patient's chronic conditions and co-morbidity symptoms are monitored and maintained or improved  Outcome: Progressing  Flowsheets (Taken 10/24/2024 2007)  Care Plan - Patient's Chronic Conditions and Co-Morbidity Symptoms are Monitored and Maintained or Improved:   Monitor and assess patient's chronic conditions and comorbid symptoms for stability, deterioration, or improvement   Collaborate with multidisciplinary team to address chronic and comorbid conditions and prevent exacerbation or deterioration   Update acute care plan with appropriate goals if chronic or comorbid symptoms are exacerbated and prevent overall improvement and discharge     Problem: Pain  Goal: Verbalizes/displays adequate comfort level or baseline comfort level  Outcome: Progressing  Flowsheets (Taken 10/25/2024 0035)  Verbalizes/displays adequate comfort level or baseline comfort level:   Encourage patient to monitor pain and request assistance   Assess pain using appropriate pain scale   Administer analgesics based on type and severity of pain and evaluate response   Implement non-pharmacological measures as appropriate and evaluate response   Consider cultural and social influences on pain and pain management     Problem: ABCDS Injury Assessment  Goal: Absence of physical injury  Outcome: Progressing  Flowsheets  Taken 10/25/2024 0035 by Janine De La Cruz RN  Absence of Physical Injury: Implement safety measures based on patient assessment  Taken 10/24/2024 1706 by

## 2024-10-25 NOTE — DISCHARGE SUMMARY
DISCHARGE SUMMARY NOTE:      Patient Identification  Gamaliel Segovia is a 79 y.o. male.  :  1945  Admit Date:  10/24/2024    Discharge date:   No discharge date for patient encounter.                                   Disposition: home    Condition: Good    Discharge Diagnoses:   There is no problem list on file for this patient.        Consults: Hospitalist service    Surgery: Robot-assisted laparoscopic radical prostatectomy with lymph node dissection and nerve wrap / graft     Patient Instructions:   Activity: no heavy lifting, pushing, pulling for 6 weeks months, no driving for 2 weeks or while on analgesics  Diet: As tolerated  Follow-up with Dr. Garcia's team in 1-2 weeks as scheduled.    Hospital course :  Diet was advanced and pain was controlled with PO medications. Being discharged tolerating diet, ambulating on own and with adequate pain control with PO medications.

## 2024-10-29 LAB
BASOPHILS ABSOLUTE: 0 K/UL (ref 0–0.3)
BASOPHILS RELATIVE PERCENT: 0.3 % (ref 0–2)
BUN / CREAT RATIO: 13 (ref 7–25)
BUN BLDV-MCNC: 9 MG/DL (ref 3–29)
CALCIUM SERPL-MCNC: 9.2 MG/DL (ref 8.5–10.5)
CHLORIDE BLD-SCNC: 94 MEQ/L (ref 96–110)
CO2: 24 MEQ/L (ref 19–32)
CREAT SERPL-MCNC: 0.7 MG/DL (ref 0.5–1.2)
DIFFERENTIAL COUNT: ABNORMAL
EOSINOPHILS ABSOLUTE: 0.3 K/UL (ref 0–0.5)
EOSINOPHILS RELATIVE PERCENT: 2.3 % (ref 0–5)
ESTIMATED GLOMERULAR FILTRATION RATE CREATININE EQUATION: 95 MLS/MIN/1.73M2
FASTING STATUS: ABNORMAL
GLUCOSE BLD-MCNC: 144 MG/DL (ref 70–99)
HCT VFR BLD CALC: 37.2 % (ref 37.5–51)
HEMOGLOBIN: 12.5 G/DL (ref 13–17.7)
IMMATURE GRANS (ABS): 0.2 K/UL (ref 0–0.1)
IMMATURE GRANULOCYTES %: 1.8 %
LYMPHOCYTES ABSOLUTE: 1.5 K/UL (ref 0.9–4.1)
LYMPHOCYTES RELATIVE PERCENT: 12.7 % (ref 14–51)
MCH RBC QN AUTO: 31.2 PG (ref 26–34)
MCHC RBC AUTO-ENTMCNC: 33.6 G/DL (ref 30.7–35.5)
MCV RBC AUTO: 92.8 FL (ref 80–100)
MONOCYTES ABSOLUTE: 1.1 K/UL (ref 0.2–1)
MONOCYTES RELATIVE PERCENT: 8.9 % (ref 4–12)
NEUTROPHILS ABSOLUTE: 8.8 K/UL (ref 1.8–7.5)
NEUTROPHILS RELATIVE PERCENT: 74 % (ref 42–80)
PDW BLD-RTO: 11.7 %
PLATELET # BLD: 446 K/UL (ref 140–400)
PMV BLD AUTO: 9.7 FL (ref 7.2–11.7)
POTASSIUM SERPL-SCNC: 4.6 MEQ/L (ref 3.4–5.3)
RBC # BLD: 4.01 M/UL (ref 4.14–5.8)
RETICULOCYTE ABSOLUTE COUNT: 0 /100 WBC
SODIUM BLD-SCNC: 128 MEQ/L (ref 135–148)
WBC # BLD: 11.9 K/UL (ref 3.5–10.9)

## (undated) DEVICE — CATHETER,FOLEY,SILI-ELAST,LTX,20FR,10ML: Brand: MEDLINE

## (undated) DEVICE — BLADE CLIPPER GEN PURP NS

## (undated) DEVICE — CATHETER,FOLEY,SILI-ELAST,LTX,18FR,10ML: Brand: MEDLINE

## (undated) DEVICE — SYRINGE CATH TIP 50ML

## (undated) DEVICE — DRESSING TRNSPAR W2XL2.75IN FLM SHT SEMIPERMEABLE WIND

## (undated) DEVICE — GAUZE,SPONGE,2"X2",8PLY,STERILE,LF,2'S: Brand: MEDLINE

## (undated) DEVICE — SEAL

## (undated) DEVICE — AGENT HEMSTAT 3GM OXIDIZED REGENERATED CELOS ABSRB FOR CONT (ORDER MULTIPLES OF 5EA)

## (undated) DEVICE — APPLICATOR ENDOSCP L34CM W/ S STL CANN PLAS OBT STYL FOR

## (undated) DEVICE — TIP COVER ACCESSORY

## (undated) DEVICE — GLOVE SURG SZ 6 THK91MIL LTX FREE SYN POLYISOPRENE ANTI

## (undated) DEVICE — COLUMN DRAPE

## (undated) DEVICE — CONTAINER,SPECIMEN,PNEU TUBE,4OZ,OR STRL: Brand: MEDLINE

## (undated) DEVICE — Device

## (undated) DEVICE — SOLUTION PREP PAINT POV IOD FOR SKIN MUCOUS MEM

## (undated) DEVICE — TISSUE RETRIEVAL SYSTEM: Brand: INZII RETRIEVAL SYSTEM

## (undated) DEVICE — 40586 ADVANCED TRENDELENBURG POSITIONING KIT: Brand: 40586 ADVANCED TRENDELENBURG POSITIONING KIT

## (undated) DEVICE — SUTURE VICRYL COATED RB CRCLE 1 1/2 CIR BRDD ANTBCTRL TPR PNT CTD VLT

## (undated) DEVICE — AGENT HEMOSTATIC SURG NU-KNIT ABS 3X4IN WOVEN KNIT 12/BX

## (undated) DEVICE — ROBOTIC PROSTATE: Brand: MEDLINE INDUSTRIES, INC.

## (undated) DEVICE — SUTURE VICRYL + SZ 0 L27IN ABSRB VLT L36MM CT-1 1/2 CIR VCPB260H

## (undated) DEVICE — PACK,LAPAROSCOPY,PK II,SIRUS: Brand: MEDLINE

## (undated) DEVICE — DRESSING TRNSPAR W5XL4.5IN FLM SHT SEMIPERMEABLE WIND

## (undated) DEVICE — APPLICATOR MEDICATED 26 CC SOLUTION HI LT ORNG CHLORAPREP

## (undated) DEVICE — ARM DRAPE

## (undated) DEVICE — SUTURE MONOCRYL SZ 3-0 L27IN ABSRB VLT L17MM RB-1 1/2 CIR Y305H

## (undated) DEVICE — SUTURE VICRYL + SZ 0 L27IN ABSRB VLT L26MM UR-6 5/8 CIR VCP603H

## (undated) DEVICE — SUTURE MONOCRYL SZ 3-0 L27IN ABSRB UD L17MM RB-1 1/2 CIR Y215H

## (undated) DEVICE — AGENT HEMOSTATIC SURGIFLOW MATRIX KIT W/THROMBIN

## (undated) DEVICE — BLADELESS OBTURATOR: Brand: WECK VISTA

## (undated) DEVICE — AIRSEAL 12 MM ACCESS PORT AND PALM GRIP OBTURATOR WITH BLADELESS OPTICAL TIP, 120 MM LENGTH: Brand: AIRSEAL

## (undated) DEVICE — PENCIL SMK EVAC 15FT BLADE ELECTRD ROCKER F/TELSCP

## (undated) DEVICE — PREMIUM DRY TRAY LF: Brand: MEDLINE INDUSTRIES, INC.

## (undated) DEVICE — INSUFFLATION NEEDLE TO ESTABLISH PNEUMOPERITONEUM.: Brand: INSUFFLATION NEEDLE

## (undated) DEVICE — SOLUTION SCRB 4OZ 7.5% POVIDONE IOD ANTIMIC BTL